# Patient Record
Sex: MALE | Race: ASIAN | NOT HISPANIC OR LATINO | URBAN - METROPOLITAN AREA
[De-identification: names, ages, dates, MRNs, and addresses within clinical notes are randomized per-mention and may not be internally consistent; named-entity substitution may affect disease eponyms.]

---

## 2020-08-13 ENCOUNTER — LAB OUTSIDE AN ENCOUNTER (OUTPATIENT)
Dept: URBAN - METROPOLITAN AREA CLINIC 37 | Facility: CLINIC | Age: 35
End: 2020-08-13

## 2020-09-03 ENCOUNTER — OFFICE VISIT (OUTPATIENT)
Dept: URBAN - METROPOLITAN AREA CLINIC 37 | Facility: CLINIC | Age: 35
End: 2020-09-03

## 2020-09-03 RX ORDER — HYDROCORTISONE ACETATE 25 MG/1
1 SUPPOSITORY SUPPOSITORY RECTAL QHS
Qty: 14 | Refills: 2 | COMMUNITY

## 2020-09-03 RX ORDER — CHOLECALCIFEROL (VITAMIN D3) 50 MCG
1 TABLET TABLET ORAL ONCE A DAY
Qty: 30 | Status: ACTIVE | COMMUNITY

## 2020-09-03 NOTE — HPI-MIGRATED HPI
Interim investigations : Imaging studies: ->  * US Abd on 5/5/20: Normal US abdomen. No hepatic lesion * US abd on 05/23/19: Normal US abdomen. No focal hepatic lesion * US Abd on 11/13/18: Normal;   Interim investigations : Labs done on: ->  * 08/19/2020:  - HBsAb Qn: 60 (positive immunity) - HBsAg: non-reactive  * 5/4/20:  - LFT: Alb 4.4; Tbili 0.5; Direct bili 0.1; Indirect bili 0.4; ALP 65; AST 16; ALT 9 - HBsAg: non-reactive - HBV DNA &lt;10 IU/mL, not detected * 10/14/19: - CMP: Glu 135 (H); BUN 13; Cr 0.87; Na 141; Alb 4.5; Tbili 0.9; ALP 57; AST 15; ALT 9 - HBeAg: Non-reactive - AFP: 3.0 - HBV DNA: &lt;10; not detected;   Hep- B : Family history of liver or GI malignancy -> denies;   Hep- B : Patient denies any symptoms of -> jaundice, ascites/abd. distension, peripheral edema/swelling in legs, hematemesis or melena, weight loss, change in stool color, shortness of breath, fever, fatigue, depression/Anxiety, nausea/vomiting;   Hep- B : Patient denies/admits using supplemental herbal -> denies using supplemental herbal products;   Hep- B : Patient currently has been on -> None since 1/2018 Previously was on Vemlidy 25mg daily since January 2017, stopped in 1/2018 Was on Viread in 2015 and Baraclude 0.5 mg before that;   Hep- B : Patient is here for -> routine follow up for chronic hepatitis B eAb positive;   Hep- B : Diagnosis was made -> recently on [facility] labs;   Hep- B : Last follow up OV was -> 4 months ago;

## 2020-10-22 ENCOUNTER — LAB OUTSIDE AN ENCOUNTER (OUTPATIENT)
Dept: URBAN - METROPOLITAN AREA CLINIC 37 | Facility: CLINIC | Age: 35
End: 2020-10-22

## 2020-10-23 ENCOUNTER — OFFICE VISIT (OUTPATIENT)
Dept: URBAN - METROPOLITAN AREA CLINIC 37 | Facility: CLINIC | Age: 35
End: 2020-10-23

## 2020-10-23 VITALS — WEIGHT: 130 LBS | BODY MASS INDEX: 21.66 KG/M2 | HEIGHT: 65 IN

## 2020-10-23 RX ORDER — HYDROCORTISONE ACETATE 25 MG/1
1 SUPPOSITORY SUPPOSITORY RECTAL QHS
Qty: 14 | Refills: 2 | Status: ON HOLD | COMMUNITY

## 2020-10-23 RX ORDER — HYDROCORTISONE ACETATE 25 MG/1
1 SUPPOSITORY SUPPOSITORY RECTAL BID
Qty: 20 | Refills: 2 | OUTPATIENT
Start: 2020-10-23

## 2020-10-23 RX ORDER — DOCUSATE SODIUM 100 MG/1
2 CAPSULES CAPSULE, LIQUID FILLED ORAL ONCE A DAY
Qty: 60 CAPSULE | Refills: 2 | OUTPATIENT
Start: 2020-10-23

## 2020-10-23 RX ORDER — CHOLECALCIFEROL (VITAMIN D3) 50 MCG
1 TABLET TABLET ORAL ONCE A DAY
Qty: 30 | Status: ACTIVE | COMMUNITY

## 2020-10-23 NOTE — HPI-MIGRATED HPI
Acute visit : Patient is here for an acute visit -> Rectal bleeding  Onset of Sx: intermittently over the years Current BM: daily BM Medications tried: None Previous test/evaluation done: Denies prior Colonoscopy  *** Patient also has a personal history of Chronic Hep B eAB positive.  Patient is't on any medication due spontaneous aAg clearance and sAb+ Patient has labs and US abdomen ordered on 05/2021 to confirm lasting clearance and immunity for Hep B ;

## 2020-11-17 ENCOUNTER — OFFICE VISIT (OUTPATIENT)
Dept: URBAN - METROPOLITAN AREA CLINIC 35 | Facility: CLINIC | Age: 35
End: 2020-11-17

## 2020-11-17 VITALS — HEIGHT: 65 IN

## 2020-11-17 RX ORDER — HYDROCORTISONE ACETATE 25 MG/1
1 SUPPOSITORY SUPPOSITORY RECTAL BID
Qty: 20 | Refills: 2 | Status: ACTIVE | COMMUNITY
Start: 2020-10-23

## 2020-11-17 RX ORDER — CHOLECALCIFEROL (VITAMIN D3) 50 MCG
1 TABLET TABLET ORAL ONCE A DAY
Qty: 30 | Status: ACTIVE | COMMUNITY

## 2020-11-17 RX ORDER — DOCUSATE SODIUM 100 MG/1
2 CAPSULES CAPSULE, LIQUID FILLED ORAL ONCE A DAY
Qty: 60 CAPSULE | Refills: 2 | Status: ACTIVE | COMMUNITY
Start: 2020-10-23

## 2020-11-17 RX ORDER — HYDROCORTISONE ACETATE 25 MG/1
1 SUPPOSITORY SUPPOSITORY RECTAL QHS
Qty: 14 | Refills: 2 | COMMUNITY

## 2021-01-14 ENCOUNTER — OFFICE VISIT (OUTPATIENT)
Dept: URBAN - METROPOLITAN AREA CLINIC 37 | Facility: CLINIC | Age: 36
End: 2021-01-14

## 2021-01-14 VITALS — WEIGHT: 130 LBS | BODY MASS INDEX: 21.66 KG/M2 | HEIGHT: 65 IN

## 2021-01-14 RX ORDER — CHOLECALCIFEROL (VITAMIN D3) 50 MCG
1 TABLET TABLET ORAL ONCE A DAY
Qty: 30 | Status: ACTIVE | COMMUNITY

## 2021-01-14 RX ORDER — DOCUSATE SODIUM 100 MG/1
2 CAPSULES CAPSULE, LIQUID FILLED ORAL ONCE A DAY
Qty: 60 | Refills: 2 | OUTPATIENT

## 2021-01-14 RX ORDER — HYDROCORTISONE ACETATE 25 MG/1
1 SUPPOSITORY SUPPOSITORY RECTAL BID
Qty: 20 | Refills: 2 | Status: ON HOLD | COMMUNITY
Start: 2020-10-23

## 2021-01-14 RX ORDER — DOCUSATE SODIUM 100 MG/1
2 CAPSULES CAPSULE, LIQUID FILLED ORAL ONCE A DAY
Qty: 60 CAPSULE | Refills: 2 | Status: ON HOLD | COMMUNITY
Start: 2020-10-23

## 2021-01-14 RX ORDER — HYDROCORTISONE ACETATE 25 MG/1
1 SUPPOSITORY SUPPOSITORY RECTAL BID
Qty: 20 | Refills: 2 | OUTPATIENT

## 2021-01-14 NOTE — HPI-MIGRATED HPI
Follow up OV : Current symptoms are -> No more anal/rectal bleeding Has daily BMs  *** Patient also has a personal history of Chronic Hep B eAB positive.  Patient is't on any medication due spontaneous aAg clearance and sAb+ Patient has labs and US abdomen ordered on 05/2021 to confirm lasting clearance and immunity for Hep B;   Follow up OV : Patient is here for a routine OV for -> Rectal bleeding;   Follow up OV : Last OV was -> 2 months ago;   Follow up OV : Patient is on -> Hydrocortisone Acetate 25 mg suppository BID + Docusate 200 mg daily;

## 2021-05-03 ENCOUNTER — LAB OUTSIDE AN ENCOUNTER (OUTPATIENT)
Dept: URBAN - METROPOLITAN AREA CLINIC 37 | Facility: CLINIC | Age: 36
End: 2021-05-03

## 2021-05-04 ENCOUNTER — OFFICE VISIT (OUTPATIENT)
Dept: URBAN - METROPOLITAN AREA CLINIC 35 | Facility: CLINIC | Age: 36
End: 2021-05-04

## 2021-05-11 LAB
AFP-L3: (no result)
AFP: 2.1
ALBUMIN/GLOBULIN RATIO: 2
ALBUMIN: 4.7
ALKALINE PHOSPHATASE: 79
ALT: 12
AST: 20
BILIRUBIN, DIRECT: 0.2
BILIRUBIN, INDIRECT: 0.8
BILIRUBIN, TOTAL: 1
GLOBULIN: 2.4
HEPATITIS B SURFACE: (no result)
PROTEIN, TOTAL: 7.1

## 2021-05-20 ENCOUNTER — OFFICE VISIT (OUTPATIENT)
Dept: URBAN - METROPOLITAN AREA CLINIC 35 | Facility: CLINIC | Age: 36
End: 2021-05-20

## 2021-05-20 VITALS — BODY MASS INDEX: 21.66 KG/M2 | WEIGHT: 130 LBS | HEIGHT: 65 IN

## 2021-05-20 RX ORDER — HYDROCORTISONE ACETATE 25 MG/1
1 SUPPOSITORY SUPPOSITORY RECTAL BID
Qty: 20 | Refills: 2 | Status: DISCONTINUED | COMMUNITY

## 2021-05-20 RX ORDER — DOCUSATE SODIUM 100 MG/1
2 CAPSULES CAPSULE, LIQUID FILLED ORAL ONCE A DAY
Qty: 60 | Refills: 2 | OUTPATIENT

## 2021-05-20 RX ORDER — CHOLECALCIFEROL (VITAMIN D3) 50 MCG
1 TABLET TABLET ORAL ONCE A DAY
Qty: 30 | Status: ACTIVE | COMMUNITY

## 2021-05-20 RX ORDER — DOCUSATE SODIUM 100 MG/1
2 CAPSULES CAPSULE, LIQUID FILLED ORAL ONCE A DAY
Qty: 60 | Refills: 2 | Status: DISCONTINUED | COMMUNITY

## 2021-05-20 RX ORDER — HYDROCORTISONE ACETATE 25 MG/1
1 SUPPOSITORY SUPPOSITORY RECTAL BID
Qty: 20 | Refills: 2 | OUTPATIENT

## 2021-05-20 NOTE — HPI-MIGRATED HPI
Interim investigations : Imaging studies: ->  * US Abd on 05/04/2021: Normal US abdomen. No hepatic lesion * US Abd on 5/5/20: Normal US abdomen. No hepatic lesion * US abd on 05/23/19: Normal US abdomen. No focal hepatic lesion * US Abd on 11/13/18: Normal;   Interim investigations : Labs done on: ->  * 05/07/2021, see below;   Hep- B : Last follow up OV was -> 4 months ago;   Hep- B : Patient denies any symptoms of -> jaundice, ascites/abd. distension, peripheral edema/swelling in legs, hematemesis or melena, weight loss, change in stool color, shortness of breath, fever, fatigue, depression/Anxiety, nausea/vomiting;   Hep- B : Patient denies/admits using supplemental herbal -> denies using supplemental herbal products;   Hep- B : Patient currently has been on -> None since 1/2018 Previously was on Vemlidy 25mg daily since January 2017, stopped in 1/2018 Was on Viread in 2015 and Baraclude 0.5 mg before that;   Hep- B : Family history of liver or GI malignancy -> denies;   Hep- B : Patient is here for -> routine follow up for chronic hepatitis B eAb positive, eAg negative (spontaneous clearance);   Hep- B : Diagnosis was made -> recently on [employer] labs;

## 2022-10-28 ENCOUNTER — LAB OUTSIDE AN ENCOUNTER (OUTPATIENT)
Dept: URBAN - METROPOLITAN AREA CLINIC 36 | Facility: CLINIC | Age: 37
End: 2022-10-28

## 2022-10-28 ENCOUNTER — TELEPHONE ENCOUNTER (OUTPATIENT)
Dept: URBAN - METROPOLITAN AREA CLINIC 36 | Facility: CLINIC | Age: 37
End: 2022-10-28

## 2022-11-17 ENCOUNTER — LAB OUTSIDE AN ENCOUNTER (OUTPATIENT)
Dept: URBAN - METROPOLITAN AREA CLINIC 35 | Facility: CLINIC | Age: 37
End: 2022-11-17

## 2022-11-21 LAB
AFP, SERUM, TUMOR MARKER: 3.3
ALBUMIN/GLOBULIN RATIO: 1.8
ALBUMIN: 4.5
ALKALINE PHOSPHATASE: 73
ALT: 17
AST: 20
BILIRUBIN, TOTAL: 1.1
BUN/CREATININE RATIO: (no result)
CALCIUM: 10
CARBON DIOXIDE: 26
CHLORIDE: 104
CREATININE: 0.91
EGFR: 110
FIB 4 INDEX: 0.94
FIB 4 INTERPRETATION: (no result)
GLOBULIN: 2.5
GLUCOSE: 103
HEPATITIS B SURFACE ANTIGEN: (no result)
HEPATITIS B VIRUS DNA: (no result)
HEPATITIS B VIRUS DNA: (no result)
PLATELET COUNT: 202
POTASSIUM: 4.3
PROTEIN, TOTAL: 7
SODIUM: 139
UREA NITROGEN (BUN): 10

## 2022-12-15 ENCOUNTER — DASHBOARD ENCOUNTERS (OUTPATIENT)
Age: 37
End: 2022-12-15

## 2022-12-15 ENCOUNTER — OFFICE VISIT (OUTPATIENT)
Dept: URBAN - METROPOLITAN AREA CLINIC 37 | Facility: CLINIC | Age: 37
End: 2022-12-15
Payer: COMMERCIAL

## 2022-12-15 VITALS
HEIGHT: 65 IN | BODY MASS INDEX: 21.83 KG/M2 | DIASTOLIC BLOOD PRESSURE: 80 MMHG | WEIGHT: 131 LBS | OXYGEN SATURATION: 98 % | SYSTOLIC BLOOD PRESSURE: 118 MMHG | HEART RATE: 84 BPM

## 2022-12-15 DIAGNOSIS — K62.5 RECTAL BLEEDING: ICD-10-CM

## 2022-12-15 DIAGNOSIS — K64.8 OTHER HEMORRHOIDS: ICD-10-CM

## 2022-12-15 DIAGNOSIS — B18.1 CHRONIC VIRAL HEPATITIS B WITHOUT DELTA-AGENT: ICD-10-CM

## 2022-12-15 DIAGNOSIS — R10.13 EPIGASTRIC PAIN: ICD-10-CM

## 2022-12-15 PROBLEM — 12063002: Status: ACTIVE | Noted: 2020-10-22

## 2022-12-15 PROBLEM — 70153002 HEMORRHOIDS: Status: ACTIVE | Noted: 2018-01-18

## 2022-12-15 PROBLEM — 79922009: Status: ACTIVE | Noted: 2022-12-15

## 2022-12-15 PROCEDURE — 99214 OFFICE O/P EST MOD 30 MIN: CPT | Performed by: INTERNAL MEDICINE

## 2022-12-15 RX ORDER — HYDROCORTISONE ACETATE 25 MG/1
1 SUPPOSITORY SUPPOSITORY RECTAL BID
Qty: 20 | Refills: 2 | OUTPATIENT

## 2022-12-15 RX ORDER — DOCUSATE SODIUM 100 MG/1
2 CAPSULES CAPSULE, LIQUID FILLED ORAL ONCE A DAY
Qty: 60 | Refills: 2 | OUTPATIENT

## 2022-12-15 RX ORDER — HYDROCORTISONE ACETATE 25 MG/1
1 SUPPOSITORY SUPPOSITORY RECTAL BID
Qty: 20 | Refills: 2 | Status: ON HOLD | COMMUNITY

## 2022-12-15 RX ORDER — DOCUSATE SODIUM 100 MG/1
2 CAPSULES CAPSULE, LIQUID FILLED ORAL ONCE A DAY
Qty: 60 | Refills: 2 | Status: ON HOLD | COMMUNITY

## 2022-12-15 RX ORDER — OMEPRAZOLE 20 MG/1
1 CAPSULE 30 MINUTES BEFORE MORNING MEAL CAPSULE, DELAYED RELEASE ORAL ONCE A DAY
Qty: 30 | Refills: 2 | OUTPATIENT
Start: 2022-12-15

## 2022-12-15 RX ORDER — CHOLECALCIFEROL (VITAMIN D3) 50 MCG
1 TABLET TABLET ORAL ONCE A DAY
Qty: 30 | Status: ACTIVE | COMMUNITY

## 2022-12-15 NOTE — PHYSICAL EXAM HENT:
Head, normocephalic, atraumatic, Face, Face within normal limits, Ears, External ears within normal limits, Nose/Nasopharynx, External nose  normal appearance, nares patent, no nasal discharge, Mouth and Throat, Oral cavity appearance normal, Breath odor normal, Lips, Appearance normal Clofazimine Counseling:  I discussed with the patient the risks of clofazimine including but not limited to skin and eye pigmentation, liver damage, nausea/vomiting, gastrointestinal bleeding and allergy.

## 2022-12-15 NOTE — HPI-HEP B
Patient is here for a routine follow up for Hepatitis B eAb positive, eAg negative (spontaneous clearance) Last OV was 1 year and 7 months

## 2024-12-05 ENCOUNTER — TELEPHONE ENCOUNTER (OUTPATIENT)
Dept: URBAN - METROPOLITAN AREA CLINIC 35 | Facility: CLINIC | Age: 39
End: 2024-12-05

## 2024-12-09 LAB
A/G RATIO: 1.9
AFP, SERUM, TUMOR MARKER: 3.3
ALBUMIN: 4.6
ALKALINE PHOSPHATASE: 77
ALT (SGPT): 15
AST (SGOT): 22
BILIRUBIN, TOTAL: 0.9
BUN/CREATININE RATIO: (no result)
BUN: 12
CALCIUM: 9.7
CARBON DIOXIDE, TOTAL: 27
CHLORIDE: 104
CREATININE: 0.98
EGFR: 99
GLOBULIN, TOTAL: 2.4
GLUCOSE: 86
HEMATOCRIT: 48.2
HEMOGLOBIN: 16.4
HEP BE AB: REACTIVE
HEP BE AG: (no result)
HEPATITIS B VIRUS DNA: NOT DETECTED
HEPATITIS B VIRUS DNA: NOT DETECTED
MCH: 32.5
MCHC: 34
MCV: 95.4
MPV: 10.4
PLATELET COUNT: 187
POTASSIUM: 4.7
PROTEIN, TOTAL: 7
RDW: 13.1
RED BLOOD CELL COUNT: 5.05
SODIUM: 140
WHITE BLOOD CELL COUNT: 8.5

## 2024-12-12 ENCOUNTER — OFFICE VISIT (OUTPATIENT)
Dept: URBAN - METROPOLITAN AREA CLINIC 37 | Facility: CLINIC | Age: 39
End: 2024-12-12
Payer: COMMERCIAL

## 2024-12-12 VITALS
DIASTOLIC BLOOD PRESSURE: 66 MMHG | WEIGHT: 131 LBS | BODY MASS INDEX: 21.83 KG/M2 | HEIGHT: 65 IN | SYSTOLIC BLOOD PRESSURE: 132 MMHG

## 2024-12-12 DIAGNOSIS — B18.1 CHRONIC VIRAL HEPATITIS B WITHOUT DELTA-AGENT: ICD-10-CM

## 2024-12-12 DIAGNOSIS — R10.13 EPIGASTRIC PAIN: ICD-10-CM

## 2024-12-12 DIAGNOSIS — K62.5 RECTAL BLEEDING: ICD-10-CM

## 2024-12-12 DIAGNOSIS — K64.8 OTHER HEMORRHOIDS: ICD-10-CM

## 2024-12-12 PROCEDURE — 99213 OFFICE O/P EST LOW 20 MIN: CPT | Performed by: INTERNAL MEDICINE

## 2024-12-12 RX ORDER — CHOLECALCIFEROL (VITAMIN D3) 50 MCG
1 TABLET TABLET ORAL ONCE A DAY
Qty: 30 | Status: ACTIVE | COMMUNITY

## 2024-12-12 RX ORDER — OMEPRAZOLE 20 MG/1
1 CAPSULE 30 MINUTES BEFORE MORNING MEAL CAPSULE, DELAYED RELEASE ORAL ONCE A DAY
Qty: 30 | Refills: 2 | OUTPATIENT

## 2024-12-12 RX ORDER — HYDROCORTISONE ACETATE 25 MG/1
1 SUPPOSITORY SUPPOSITORY RECTAL BID
Qty: 20 | Refills: 2 | OUTPATIENT

## 2024-12-12 RX ORDER — HYDROCORTISONE ACETATE 25 MG/1
1 SUPPOSITORY SUPPOSITORY RECTAL BID
Qty: 20 | Refills: 2 | Status: ON HOLD | COMMUNITY

## 2024-12-12 RX ORDER — OMEPRAZOLE 20 MG/1
1 CAPSULE 30 MINUTES BEFORE MORNING MEAL CAPSULE, DELAYED RELEASE ORAL ONCE A DAY
Qty: 30 | Refills: 2 | Status: ON HOLD | COMMUNITY
Start: 2022-12-15

## 2024-12-12 RX ORDER — DOCUSATE SODIUM 100 MG/1
2 CAPSULES CAPSULE, LIQUID FILLED ORAL ONCE A DAY
Qty: 60 | Refills: 2 | OUTPATIENT

## 2024-12-12 RX ORDER — DOCUSATE SODIUM 100 MG/1
2 CAPSULES CAPSULE, LIQUID FILLED ORAL ONCE A DAY
Qty: 60 | Refills: 2 | Status: ON HOLD | COMMUNITY

## 2024-12-12 NOTE — HPI-HEP B
Patient is here for a routine follow up for Hepatitis B eAb positive, eAg negative (spontaneous clearance) Last OV was 2 years ago